# Patient Record
Sex: FEMALE | URBAN - METROPOLITAN AREA
[De-identification: names, ages, dates, MRNs, and addresses within clinical notes are randomized per-mention and may not be internally consistent; named-entity substitution may affect disease eponyms.]

---

## 2021-03-13 ENCOUNTER — EMERGENCY (EMERGENCY)
Facility: HOSPITAL | Age: 51
LOS: 1 days | Discharge: HOME | End: 2021-03-13
Attending: EMERGENCY MEDICINE | Admitting: EMERGENCY MEDICINE
Payer: SELF-PAY

## 2021-03-13 VITALS
DIASTOLIC BLOOD PRESSURE: 74 MMHG | RESPIRATION RATE: 18 BRPM | TEMPERATURE: 97 F | WEIGHT: 179.9 LBS | OXYGEN SATURATION: 97 % | SYSTOLIC BLOOD PRESSURE: 108 MMHG | HEART RATE: 59 BPM

## 2021-03-13 PROCEDURE — 99283 EMERGENCY DEPT VISIT LOW MDM: CPT

## 2021-03-13 NOTE — ED ADULT TRIAGE NOTE - NSWEIGHTCALCTOOLDRUG_GEN_A_CORE
Pt ambulated tyo bathroom via walker with tech x1 assist. Passing gas, no bm noted this shift. Pain controlled. Stable.     used

## 2021-03-13 NOTE — ED PROVIDER NOTE - CLINICAL SUMMARY MEDICAL DECISION MAKING FREE TEXT BOX
Pt altered as per ems her friend stated she had some GHB earlier. Will follow for clinical sobriety.

## 2021-03-13 NOTE — ED PROVIDER NOTE - PATIENT PORTAL LINK FT
You can access the FollowMyHealth Patient Portal offered by [facility] by registering at the following website: [email or web address]. By joining [employer]’s FollowMyHealth portal, you will also be able to view your health information using other applications (apps) compatible with our system.

## 2021-03-13 NOTE — ED ADULT NURSE NOTE - BEFAST ARM SIDE DRIFT
Patient's , Alfred left message stating they have some concerns and would like a call back.    Patient POD 1 from C5-6 anterior cervical diskectomy and disc replacement.    Call placed back to Alfred, he states patient has a pressure type feeling below her neck bone down to her lower neck, worse with laying on her back. She feels like she's getting enough oxygen, however the pressure makes it feel like it's a little hard to breath. She feels better if she lifts her shoulders up while laying on her back, or if she lays on her side. However it's difficult to keep in those positions for a long period of time. Alfred states she has very little swelling to incision line, denies bruising surrounding incision, and denies swelling distal to incision in any areas. Discussed with Carmelina DARBY. No concerns at this time. Patient to continue to ice, monitor incision, ambulate every 20-30 minutes during the day. If patient becomes short of breath, unable to breath, notices increased swelling to neck/incision area along she should be evaluated in ED immediately. They state back understanding of same. They will send pictures of patient's incision via patient's [employer] dawna and will call with an update on Monday.   
No

## 2021-03-13 NOTE — ED ADULT NURSE NOTE - NSIMPLEMENTINTERV_GEN_ALL_ED
Implemented All Universal Safety Interventions:  [place] to call system. Call bell, personal items and telephone within reach. Instruct patient to call for assistance. Room bathroom lighting operational. Non-slip footwear when patient is off stretcher. Physically safe environment: no spills, clutter or unnecessary equipment. Stretcher in lowest position, wheels locked, appropriate side rails in place.

## 2021-03-17 DIAGNOSIS — R41.82 ALTERED MENTAL STATUS, UNSPECIFIED: ICD-10-CM
